# Patient Record
Sex: FEMALE | URBAN - METROPOLITAN AREA
[De-identification: names, ages, dates, MRNs, and addresses within clinical notes are randomized per-mention and may not be internally consistent; named-entity substitution may affect disease eponyms.]

---

## 2020-05-27 ENCOUNTER — NEW PATIENT (OUTPATIENT)
Dept: URBAN - METROPOLITAN AREA CLINIC 85 | Facility: CLINIC | Age: 70
End: 2020-05-27
Payer: COMMERCIAL

## 2020-05-27 PROCEDURE — 92004 COMPRE OPH EXAM NEW PT 1/>: CPT | Performed by: OPHTHALMOLOGY

## 2020-05-27 PROCEDURE — 92134 CPTRZ OPH DX IMG PST SGM RTA: CPT | Performed by: OPHTHALMOLOGY

## 2020-05-27 ASSESSMENT — INTRAOCULAR PRESSURE
OS: 14
OD: 16

## 2020-05-27 ASSESSMENT — KERATOMETRY
OS: 45.00
OD: 45.38

## 2020-05-27 ASSESSMENT — VISUAL ACUITY
OD: 20/40
OS: 20/30

## 2020-08-28 ENCOUNTER — FOLLOW UP ESTABLISHED (OUTPATIENT)
Dept: URBAN - METROPOLITAN AREA CLINIC 85 | Facility: CLINIC | Age: 70
End: 2020-08-28
Payer: COMMERCIAL

## 2020-08-28 DIAGNOSIS — H25.13 AGE-RELATED NUCLEAR CATARACT, BILATERAL: ICD-10-CM

## 2020-08-28 DIAGNOSIS — H25.813 COMBINED FORMS OF AGE-RELATED CATARACT, BILATERAL: Primary | ICD-10-CM

## 2020-08-28 PROCEDURE — 92025 CPTRIZED CORNEAL TOPOGRAPHY: CPT | Performed by: OPHTHALMOLOGY

## 2020-08-28 PROCEDURE — 92014 COMPRE OPH EXAM EST PT 1/>: CPT | Performed by: OPHTHALMOLOGY

## 2020-08-28 PROCEDURE — 92134 CPTRZ OPH DX IMG PST SGM RTA: CPT | Performed by: OPHTHALMOLOGY

## 2020-08-28 RX ORDER — KETOROLAC TROMETHAMINE 5 MG/ML
0.5 % SOLUTION OPHTHALMIC
Qty: 1 | Refills: 1 | Status: INACTIVE
Start: 2020-08-28 | End: 2021-02-22

## 2020-08-28 ASSESSMENT — KERATOMETRY
OS: 44.75
OD: 45.00

## 2020-08-28 ASSESSMENT — INTRAOCULAR PRESSURE
OD: 20
OS: 21

## 2020-08-28 ASSESSMENT — VISUAL ACUITY
OD: 20/40
OS: 20/30

## 2020-09-22 ENCOUNTER — Encounter (OUTPATIENT)
Dept: URBAN - METROPOLITAN AREA CLINIC 85 | Facility: CLINIC | Age: 70
End: 2020-09-22
Payer: MEDICARE

## 2020-09-22 DIAGNOSIS — Z01.818 ENCOUNTER FOR OTHER PREPROCEDURAL EXAMINATION: Primary | ICD-10-CM

## 2020-09-22 PROCEDURE — 99213 OFFICE O/P EST LOW 20 MIN: CPT | Performed by: PHYSICIAN ASSISTANT

## 2020-10-01 ENCOUNTER — SURGERY (OUTPATIENT)
Dept: URBAN - METROPOLITAN AREA SURGERY 55 | Facility: SURGERY | Age: 70
End: 2020-10-01
Payer: MEDICARE

## 2020-10-01 PROCEDURE — 66984 XCAPSL CTRC RMVL W/O ECP: CPT | Performed by: OPHTHALMOLOGY

## 2020-10-02 ENCOUNTER — POST OP (OUTPATIENT)
Dept: URBAN - METROPOLITAN AREA CLINIC 85 | Facility: CLINIC | Age: 70
End: 2020-10-02
Payer: MEDICARE

## 2020-10-02 DIAGNOSIS — Z96.1 PRESENCE OF INTRAOCULAR LENS: Primary | ICD-10-CM

## 2020-10-02 PROCEDURE — 99024 POSTOP FOLLOW-UP VISIT: CPT | Performed by: PHYSICIAN ASSISTANT

## 2020-10-02 ASSESSMENT — INTRAOCULAR PRESSURE: OD: 20

## 2020-10-08 ENCOUNTER — POST OP (OUTPATIENT)
Dept: URBAN - METROPOLITAN AREA CLINIC 85 | Facility: CLINIC | Age: 70
End: 2020-10-08
Payer: MEDICARE

## 2020-10-08 DIAGNOSIS — H25.812 COMBINED FORMS OF AGE-RELATED CATARACT, LEFT EYE: ICD-10-CM

## 2020-10-08 PROCEDURE — 99024 POSTOP FOLLOW-UP VISIT: CPT | Performed by: OPTOMETRIST

## 2020-10-08 RX ORDER — PREDNISOLONE ACETATE 10 MG/ML
1 % SUSPENSION/ DROPS OPHTHALMIC
Qty: 1 | Refills: 1 | Status: INACTIVE
Start: 2020-10-08 | End: 2021-02-22

## 2020-10-08 ASSESSMENT — VISUAL ACUITY
OS: 20/30
OD: 20/20

## 2020-10-08 ASSESSMENT — INTRAOCULAR PRESSURE
OS: 19
OD: 18

## 2020-10-15 ENCOUNTER — SURGERY (OUTPATIENT)
Dept: URBAN - METROPOLITAN AREA SURGERY 55 | Facility: SURGERY | Age: 70
End: 2020-10-15
Payer: MEDICARE

## 2020-10-15 PROCEDURE — 66984 XCAPSL CTRC RMVL W/O ECP: CPT | Performed by: OPHTHALMOLOGY

## 2020-10-16 ENCOUNTER — POST OP (OUTPATIENT)
Dept: URBAN - METROPOLITAN AREA CLINIC 85 | Facility: CLINIC | Age: 70
End: 2020-10-16
Payer: MEDICARE

## 2020-10-16 PROCEDURE — 99024 POSTOP FOLLOW-UP VISIT: CPT | Performed by: PHYSICIAN ASSISTANT

## 2020-10-16 ASSESSMENT — INTRAOCULAR PRESSURE: OS: 20

## 2020-11-05 ENCOUNTER — POST OP (OUTPATIENT)
Dept: URBAN - METROPOLITAN AREA CLINIC 85 | Facility: CLINIC | Age: 70
End: 2020-11-05
Payer: MEDICARE

## 2020-11-05 PROCEDURE — 99024 POSTOP FOLLOW-UP VISIT: CPT | Performed by: OPTOMETRIST

## 2020-11-05 ASSESSMENT — INTRAOCULAR PRESSURE
OD: 14
OS: 14

## 2020-11-05 ASSESSMENT — VISUAL ACUITY
OD: 20/20
OS: 20/20

## 2021-02-22 ENCOUNTER — OFFICE VISIT (OUTPATIENT)
Dept: URBAN - METROPOLITAN AREA CLINIC 83 | Facility: CLINIC | Age: 71
End: 2021-02-22
Payer: MEDICARE

## 2021-02-22 PROCEDURE — 92014 COMPRE OPH EXAM EST PT 1/>: CPT | Performed by: OPHTHALMOLOGY

## 2021-02-22 PROCEDURE — 92134 CPTRZ OPH DX IMG PST SGM RTA: CPT | Performed by: OPHTHALMOLOGY

## 2021-02-22 ASSESSMENT — INTRAOCULAR PRESSURE
OS: 13
OD: 14

## 2021-02-22 NOTE — IMPRESSION/PLAN
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 Plan: Exam and OCT confirm stable VMA OU. About 50% of these cases will spontaneously resolve as the vitreous separates from the retina. Occasionally, the traction will progress to a full thickness macular hole. I recommend observation for spontaneous resolution at this time. However, we may consider vitrectomy versus intravitreal ocrisplasmin in the future if there is no improvement or worsening of the condition or symptoms. 

RTC 6 months OCT OU

## 2021-05-05 ENCOUNTER — OFFICE VISIT (OUTPATIENT)
Dept: URBAN - METROPOLITAN AREA CLINIC 85 | Facility: CLINIC | Age: 71
End: 2021-05-05
Payer: MEDICARE

## 2021-05-05 DIAGNOSIS — H26.493 OTHER SECONDARY CATARACT, BILATERAL: ICD-10-CM

## 2021-05-05 DIAGNOSIS — H43.822 VITREOMACULAR ADHESION, LEFT EYE: Primary | ICD-10-CM

## 2021-05-05 DIAGNOSIS — H52.4 PRESBYOPIA: ICD-10-CM

## 2021-05-05 PROCEDURE — 92134 CPTRZ OPH DX IMG PST SGM RTA: CPT | Performed by: OPTOMETRIST

## 2021-05-05 PROCEDURE — 92014 COMPRE OPH EXAM EST PT 1/>: CPT | Performed by: OPTOMETRIST

## 2021-05-05 ASSESSMENT — INTRAOCULAR PRESSURE
OS: 17
OD: 19

## 2021-05-05 ASSESSMENT — VISUAL ACUITY
OD: 20/20
OS: 20/20

## 2021-05-05 NOTE — IMPRESSION/PLAN
Impression: Other secondary cataract, bilateral: H26.493. Plan: The risks and benefits of YAG Capsulotomy were discussed as were post-op restrictions and likelihood of increased floaters postoperatively which may require additional treatment. The patient elects to monitor.

## 2021-05-05 NOTE — IMPRESSION/PLAN
Impression: Vitreomacular adhesion, left eye: H43.822. Plan: Discussed stable findings. Continue to monitor with Dr. Toney Stage. OCT shows stable VMT OS.

## 2021-09-27 ENCOUNTER — OFFICE VISIT (OUTPATIENT)
Dept: URBAN - METROPOLITAN AREA CLINIC 83 | Facility: CLINIC | Age: 71
End: 2021-09-27
Payer: MEDICARE

## 2021-09-27 DIAGNOSIS — H43.823 VITREOMACULAR ADHESION, BILATERAL: Primary | ICD-10-CM

## 2021-09-27 PROCEDURE — 92134 CPTRZ OPH DX IMG PST SGM RTA: CPT | Performed by: OPHTHALMOLOGY

## 2021-09-27 PROCEDURE — 92014 COMPRE OPH EXAM EST PT 1/>: CPT | Performed by: OPHTHALMOLOGY

## 2021-09-27 ASSESSMENT — INTRAOCULAR PRESSURE
OS: 14
OD: 18

## 2021-09-27 NOTE — IMPRESSION/PLAN
Impression: Vitreomacular adhesion, bilateral: [id number]. Bilateral. Plan: Exam and OCT confirm stable VMA OS>OD. Given stability, I have recommended observation for spontaneous resolution at this time. However, we may consider vitrectomy versus intravitreal ocrisplasmin in the future if there is no improvement or worsening of the condition or symptoms. 

RTC 6 months OCT OU

## 2022-04-11 ENCOUNTER — OFFICE VISIT (OUTPATIENT)
Dept: URBAN - METROPOLITAN AREA CLINIC 83 | Facility: CLINIC | Age: 72
End: 2022-04-11
Payer: MEDICARE

## 2022-04-11 DIAGNOSIS — H43.823 VITREOMACULAR ADHESION, BILATERAL: Primary | ICD-10-CM

## 2022-04-11 DIAGNOSIS — Z96.1 PRESENCE OF INTRAOCULAR LENS: ICD-10-CM

## 2022-04-11 PROCEDURE — 92014 COMPRE OPH EXAM EST PT 1/>: CPT | Performed by: OPHTHALMOLOGY

## 2022-04-11 PROCEDURE — 92134 CPTRZ OPH DX IMG PST SGM RTA: CPT | Performed by: OPHTHALMOLOGY

## 2022-04-11 ASSESSMENT — INTRAOCULAR PRESSURE
OS: 12
OD: 12

## 2022-04-11 NOTE — IMPRESSION/PLAN
Impression: Vitreomacular adhesion, bilateral: [id number]. Bilateral. Plan: Exam and OCT confirm stable, mild VMA OS>OD. No change since last visit. At this time, we will release her to your excellent care. If there is worsening of her condition or symptoms in the future, please do not hesitate to have her return to see me. 

RTC PRN

## 2022-05-13 ENCOUNTER — OFFICE VISIT (OUTPATIENT)
Dept: URBAN - METROPOLITAN AREA CLINIC 85 | Facility: CLINIC | Age: 72
End: 2022-05-13
Payer: MEDICARE

## 2022-05-13 DIAGNOSIS — H43.823 VITREOMACULAR ADHESION, BILATERAL: ICD-10-CM

## 2022-05-13 DIAGNOSIS — H26.493 OTHER SECONDARY CATARACT, BILATERAL: Primary | ICD-10-CM

## 2022-05-13 DIAGNOSIS — H52.4 PRESBYOPIA: ICD-10-CM

## 2022-05-13 PROCEDURE — 92014 COMPRE OPH EXAM EST PT 1/>: CPT | Performed by: OPTOMETRIST

## 2022-05-13 PROCEDURE — 92134 CPTRZ OPH DX IMG PST SGM RTA: CPT | Performed by: OPTOMETRIST

## 2022-05-13 ASSESSMENT — INTRAOCULAR PRESSURE
OD: 14
OS: 14

## 2022-05-13 ASSESSMENT — VISUAL ACUITY
OD: 20/20
OS: 20/20

## 2022-05-13 NOTE — IMPRESSION/PLAN
Impression: Vitreomacular adhesion, bilateral: [id number]. Bilateral. Plan: Discussed stable findings with patient. Continue to monitor.

## 2022-05-13 NOTE — IMPRESSION/PLAN
Impression: Other secondary cataract, bilateral: H26.493. Plan: The risks and benefits of YAG Capsulotomy were discussed as were post-op restrictions and likelihood of increased floaters postoperatively which may require additional treatment. The patient defers.

## 2023-05-15 ENCOUNTER — OFFICE VISIT (OUTPATIENT)
Dept: URBAN - METROPOLITAN AREA CLINIC 85 | Facility: CLINIC | Age: 73
End: 2023-05-15
Payer: MEDICARE

## 2023-05-15 DIAGNOSIS — H43.823 VITREOMACULAR ADHESION, BILATERAL: Primary | ICD-10-CM

## 2023-05-15 DIAGNOSIS — H26.493 OTHER SECONDARY CATARACT, BILATERAL: ICD-10-CM

## 2023-05-15 DIAGNOSIS — H52.4 PRESBYOPIA: ICD-10-CM

## 2023-05-15 PROCEDURE — 92014 COMPRE OPH EXAM EST PT 1/>: CPT | Performed by: OPTOMETRIST

## 2023-05-15 PROCEDURE — 92134 CPTRZ OPH DX IMG PST SGM RTA: CPT | Performed by: OPTOMETRIST

## 2023-05-15 ASSESSMENT — VISUAL ACUITY
OD: 20/20
OS: 20/20

## 2023-05-15 ASSESSMENT — INTRAOCULAR PRESSURE
OS: 13
OD: 13

## 2023-05-15 NOTE — IMPRESSION/PLAN
Impression: Vitreomacular adhesion, bilateral: [id number]. Bilateral. Plan: Discussed stable findings with patient. Will continue to monitor. Patient instructed to call if decreased VA or worsening of condition.

## 2024-05-15 ENCOUNTER — OFFICE VISIT (OUTPATIENT)
Dept: URBAN - METROPOLITAN AREA CLINIC 85 | Facility: CLINIC | Age: 74
End: 2024-05-15
Payer: MEDICARE

## 2024-05-15 DIAGNOSIS — H52.539 SPASM OF ACCOMMODATION, UNSPECIFIED EYE: ICD-10-CM

## 2024-05-15 DIAGNOSIS — H43.393 OTHER VITREOUS OPACITIES, BILATERAL: ICD-10-CM

## 2024-05-15 DIAGNOSIS — H43.823 VITREOMACULAR ADHESION, BILATERAL: Primary | ICD-10-CM

## 2024-05-15 PROCEDURE — 99214 OFFICE O/P EST MOD 30 MIN: CPT | Performed by: OPTOMETRIST

## 2024-05-15 PROCEDURE — 92134 CPTRZ OPH DX IMG PST SGM RTA: CPT | Performed by: OPTOMETRIST

## 2024-05-15 ASSESSMENT — VISUAL ACUITY
OS: 20/20
OD: 20/20

## 2024-05-15 ASSESSMENT — INTRAOCULAR PRESSURE
OS: 14
OD: 14

## 2024-11-12 ENCOUNTER — OFFICE VISIT (OUTPATIENT)
Dept: URBAN - METROPOLITAN AREA CLINIC 85 | Facility: CLINIC | Age: 74
End: 2024-11-12
Payer: MEDICARE

## 2024-11-12 DIAGNOSIS — H43.393 OTHER VITREOUS OPACITIES, BILATERAL: ICD-10-CM

## 2024-11-12 DIAGNOSIS — H43.823 VITREOMACULAR ADHESION, BILATERAL: Primary | ICD-10-CM

## 2024-11-12 DIAGNOSIS — H26.493 OTHER SECONDARY CATARACT, BILATERAL: ICD-10-CM

## 2024-11-12 PROCEDURE — 92014 COMPRE OPH EXAM EST PT 1/>: CPT | Performed by: OPTOMETRIST

## 2024-11-12 PROCEDURE — 92134 CPTRZ OPH DX IMG PST SGM RTA: CPT | Performed by: OPTOMETRIST

## 2024-11-12 ASSESSMENT — VISUAL ACUITY
OS: 20/20
OD: 20/20

## 2024-11-12 ASSESSMENT — INTRAOCULAR PRESSURE
OD: 14
OS: 13